# Patient Record
Sex: MALE | Race: BLACK OR AFRICAN AMERICAN | Employment: UNEMPLOYED
[De-identification: names, ages, dates, MRNs, and addresses within clinical notes are randomized per-mention and may not be internally consistent; named-entity substitution may affect disease eponyms.]

---

## 2023-07-18 ENCOUNTER — HOSPITAL ENCOUNTER (EMERGENCY)
Facility: HOSPITAL | Age: 33
Discharge: HOME | End: 2023-07-18
Attending: EMERGENCY MEDICINE

## 2023-07-18 VITALS
OXYGEN SATURATION: 95 % | DIASTOLIC BLOOD PRESSURE: 108 MMHG | SYSTOLIC BLOOD PRESSURE: 162 MMHG | RESPIRATION RATE: 20 BRPM | HEART RATE: 138 BPM | WEIGHT: 98.11 LBS

## 2023-07-18 DIAGNOSIS — Z02.83 ENCOUNTER FOR BLOOD-ALCOHOL TEST: Primary | ICD-10-CM

## 2023-07-18 PROCEDURE — 99282 EMERGENCY DEPT VISIT SF MDM: CPT

## 2023-07-18 ASSESSMENT — PAIN - FUNCTIONAL ASSESSMENT: PAIN_FUNCTIONAL_ASSESSMENT: NONE - DENIES PAIN
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
## 2023-07-18 NOTE — ED PROVIDER NOTES
[facility]  EMERGENCY DEPARTMENT ENCOUNTER      Pt Name: Britta Rodriguez MRN: [id number]  Birthdate 1990  Date of evaluation: 7/18/2023  Provider: Rivka Foley MD    [facility]       Chief Complaint   Patient presents with    Labs Only         HISTORY OF PRESENT ILLNESS   (Location/Symptom, Timing/Onset, Context/Setting, Quality, Duration, Modifying Factors, Severity)  Note limiting factors. 70-year-old with unknown past medical history. He presents in police custody for suspected alcohol intoxication. No complaints. Review of External Medical Records:     Nursing Notes were reviewed. REVIEW OF SYSTEMS    (2-9 systems for level 4, 10 or more for level 5)     Review of Systems    Except as noted above the remainder of the review of systems was reviewed and negative. PAST MEDICAL HISTORY   No past medical history on file. SURGICAL HISTORY     No past surgical history on file. CURRENT MEDICATIONS       Previous Medications    No medications on file       ALLERGIES     Patient has no known allergies. FAMILY HISTORY     No family history on file. SOCIAL HISTORY              PHYSICAL EXAM    (up to 7 for level 4, 8 or more for level 5)     ED Triage Vitals [07/18/23 0106]   BP Temp Temp src Pulse Respirations SpO2 Height Weight - Scale   (!) 175/132 -- -- (!) 138 20 97 % -- 98 lb 1.7 oz (44.5 kg)       There is no height or weight on file to calculate BMI. Physical Exam  Constitutional:       Appearance: Normal appearance. HENT:      Head: Normocephalic and atraumatic. Mouth/Throat:      Mouth: Mucous membranes are moist.   Eyes:      Conjunctiva/sclera: Conjunctivae normal.   Cardiovascular:      Rate and Rhythm: Tachycardia present. Pulmonary:      Effort: Pulmonary effort is normal.   Abdominal:      General: There is no distension. Musculoskeletal:      Cervical back: No rigidity. Skin:     General: Skin is warm and dry.

## 2023-07-18 NOTE — ED NOTES
Patient is refusing full assessment by RN. BP elevated with HR on VS. Patient is agitated, cursing in the room at the officer and RN. Patient present  in police restraints on the hands.  present in the room at all time. No pain reports to RN. Patient does not appear in acute distress.       Tata Rm RN  07/18/23 0149

## 2023-07-18 NOTE — ED NOTES
Patient presents with [facility]  [facility] hector [id number] for blood draw. Expiration date verified on kit provided by MELI young [facility] police. Verbal consent obtained from patient for lab draw, witnessed by [facility] [place] nurse. Site prepped with iodine (it come in the kit). Labs obtained from left hand posterior by Aliza Daniel RN . Patient information verified, and kit sealed in presence of patient and officer MELI Gomes. Chain of custody maintained.      Domonique Brown RN  07/18/23 0861

## 2023-07-18 NOTE — ED NOTES
Patient left ED in no acute distress, alert and oriented x4. Patient was provided with discharge instructions. All questions were answered. Patient left ambulatory with .          Jeremie Clifford RN  07/18/23 7918